# Patient Record
Sex: FEMALE
[De-identification: names, ages, dates, MRNs, and addresses within clinical notes are randomized per-mention and may not be internally consistent; named-entity substitution may affect disease eponyms.]

---

## 2020-02-25 PROBLEM — Z00.00 ENCOUNTER FOR PREVENTIVE HEALTH EXAMINATION: Status: ACTIVE | Noted: 2020-02-25

## 2020-02-26 ENCOUNTER — FORM ENCOUNTER (OUTPATIENT)
Age: 45
End: 2020-02-26

## 2020-02-27 ENCOUNTER — OUTPATIENT (OUTPATIENT)
Dept: OUTPATIENT SERVICES | Facility: HOSPITAL | Age: 45
LOS: 1 days | End: 2020-02-27
Payer: COMMERCIAL

## 2020-02-27 ENCOUNTER — APPOINTMENT (OUTPATIENT)
Dept: ORTHOPEDIC SURGERY | Facility: CLINIC | Age: 45
End: 2020-02-27
Payer: MEDICAID

## 2020-02-27 VITALS
SYSTOLIC BLOOD PRESSURE: 130 MMHG | HEIGHT: 66 IN | WEIGHT: 179 LBS | OXYGEN SATURATION: 98 % | HEART RATE: 100 BPM | DIASTOLIC BLOOD PRESSURE: 80 MMHG | BODY MASS INDEX: 28.77 KG/M2

## 2020-02-27 DIAGNOSIS — Z87.39 PERSONAL HISTORY OF OTHER DISEASES OF THE MUSCULOSKELETAL SYSTEM AND CONNECTIVE TISSUE: ICD-10-CM

## 2020-02-27 DIAGNOSIS — M47.812 SPONDYLOSIS W/OUT MYELOPATHY OR RADICULOPATHY, CERVICAL REGION: ICD-10-CM

## 2020-02-27 DIAGNOSIS — G89.29 CERVICALGIA: ICD-10-CM

## 2020-02-27 DIAGNOSIS — Z86.19 PERSONAL HISTORY OF OTHER INFECTIOUS AND PARASITIC DISEASES: ICD-10-CM

## 2020-02-27 DIAGNOSIS — Z87.891 PERSONAL HISTORY OF NICOTINE DEPENDENCE: ICD-10-CM

## 2020-02-27 DIAGNOSIS — M54.2 CERVICALGIA: ICD-10-CM

## 2020-02-27 DIAGNOSIS — M25.50 PAIN IN UNSPECIFIED JOINT: ICD-10-CM

## 2020-02-27 PROCEDURE — 72052 X-RAY EXAM NECK SPINE 6/>VWS: CPT | Mod: 26

## 2020-02-27 PROCEDURE — 99204 OFFICE O/P NEW MOD 45 MIN: CPT

## 2020-02-27 PROCEDURE — 72052 X-RAY EXAM NECK SPINE 6/>VWS: CPT

## 2020-02-27 NOTE — REVIEW OF SYSTEMS
[Negative] : Endocrine [Joint Pain] : joint pain [Joint Stiffness] : joint stiffness [Joint Swelling] : joint swelling [Feeling Tired] : fatigue [Urinary Frequency] : urinary frequency [Urinary Urgency] : urinary urgency [Headache] : headache [Anxiety] : anxiety [Depression] : depression [Sleep Disturbances] : ~T sleep disturbances

## 2020-02-27 NOTE — HISTORY OF PRESENT ILLNESS
[de-identified] : The patient is a 44 year old woman with history of Hepatitis B presenting with neck pain.\par \par The patient presents with a several year history of vague, bilateral neck stiffness and pain.  She recently transitioned care from a hospital system in the [place].  She has multiple musculoskeletal issues including lumbar degenerative disc disease/spondylosis.  Apparently, she was not amenable to epidural injections because of history of Hepatitis B.  In addition to this, she has not followed with her Hepatologist for several months.  She was suggested to see a pain management specialist, but has yet to do so.  She also has chronic left hip pain, and has antalgic gait, for which she uses a walking cane as an assist device.  She takes sole care for several children.  She has relief when standing.\par \par She now complains of chronic bilateral paracervical pain and occasional spasm.  The patient denies red flag symptoms including fever, chills, weight loss, night sweats, bowel/bladder dysfunction, saddle anesthesia.  She had had occasional, intermittent numbness in her hands in the past, but has not been prevalent as of late.  She was previously on baclofen for muscular spasms.  \par \par Pain is rated 8/10, described as achy/stiff, without alleviating factors, worse with walking/lying supinee [8] : a current pain level of 8/10

## 2020-02-27 NOTE — DISCUSSION/SUMMARY
[de-identified] : The patient is a 44 year old woman with history of Hepatitis, Lumbar Degenerative Disc Disease, Chronic Left Hip pain with use of walking cane as chronic assist device presenting with bilateral neck pain.  Her pain is likely secondary to myofascial pain syndrome.  She has no focal deficit todays.  \par \par In general, the patient has multiple polyarthralgias.  She would benefit with close follow-up with PMD, Hepatology.  I recommend a Rheumatology consultation in the context of polyarthralgias and Hepatitis B.  She was given referral to Dr. Rendon.\par \par Imaging was reviewed with the patient in detail.  All questions were answered appropriately.\par \par We will trial a course of supervised PT.  Patient was prescribed a course of physical therapy today.  The patient was also provided some general home exercises.  The patient was counseled on activity modification.\par \par Patient encouraged to maintain healthy lifestyle habits.  She may follow with me or other provider to discuss other MSK issues that may prohibit functional improvement.\par \par Follow-up in 4-6 weeks after PT.\par \par Patient appreciates and agrees with current plan.\par \par This note was generated using dragon medical dictation software.  A reasonable effort has been made for proofreading its contents, but typos may still remain.  If there are any questions or points of clarification needed please notify my office.\par \par

## 2020-02-27 NOTE — PHYSICAL EXAM
[de-identified] : General: Well-nourished, well-developed, alert, and in no acute distress.\par Head: Normocephalic.\par Eyes: Pupils equal round reactive to light and accommodation, extraocular muscles intact, normal sclera.\par Nose: No nasal discharge.\par Cardiac: Regular rate. Extremities are warm and well perfused. Distal pulses are symmetric bilaterally.\par Respiratory: No labored breathing.\par Extremities: Sensation is intact distally bilaterally.  Distal pulses are symmetric bilaterally\par Neurologic: No focal deficits\par Skin: Normal skin color, texture, and turgor\par Psychiatric: Normal affect\par \par Cervical Spine:\par \par Inspection:\par ·	Alignment/Posture: STRAIGHTNENING OF LORDOSIS\par ·	Spasm not noted. \par ·	No scars\par \par Palpation:   \par ·	Midline:  NO pain\par ·	Paracervical:    MILD BILATERAL pain\par ·	Trapezius:   MILD BILATERAL pain\par ·	Levator Scapulae:  NO pain\par ·	Rhomboids:  NO pain\par \par \par ROM: \par ·	 Flexion:  30 degrees, without pain.\par ·	Extension:  30 degrees without pain  \par ·	Side Bendin degrees without pain \par ·	Rotation:  50 degrees without pain \par \par \par Strength: \par ·	5/5 Flexion, Extension, Sidebending, Rotation\par \par NEURO  \par ·	Sensation:   Intact throughout the upper and lower extremity\par ·	DTR's:  Symmetric throughout the upper and lower extremity.  \par \par Other tests: \par ·	Spurling's Maneuver: NEGATIVE \par \par Vascular:  \par ·	No cyanosis, clubbing, edema.  \par ·	Intact pulses distally\par \par Low Back:\par \par Inspection:\par ·	Alignment: LEFT-SIDED LIST, RIGHT-SIDED HIP HITCH, FORWARD STOOPED POSTURE\par ·	Spasm not noted. \par ·	No scars\par \par Palpation:   \par ·	SI  Joints:  MILD BILATERAL pain\par ·	Iliolumbar ligaments    NO pain\par ·	Quadratus lumborum   NO pain\par ·	Interspinous ligament  NO pain\par ·	Greater trochanter  NO pain\par ·	Mid thoracic spine: NO  pain\par \par \par ROM: \par ·	 Flexion:  50 degrees, without pain.\par ·	Extension:  5-10 degrees without pain  \par ·	Side Bending:  Full, without pain \par PAIN WITH FACET LOADING\par \par \par Strength: \par ·	Core:  Trendelenburg: FAIR-GOOD\par ·	Hip / Leg Flexion, Extension  4+/5\par ·	Foot Eversion/ Inversion:   5/5\par ·	Calf:   5/5\par ·	EHL:  5/5\par \par NEURO  \par ·	Sensation:   Intact throughout the lower extremity\par ·	DTR's:  Symmetric throughout the lower extremity.  \par \par Other tests: \par ·	Slump test: NEGATIVE\par ·	Straight leg raise test:   NEGATIVE\par Vascular:  \par ·	No cyanosis, clubbing, edema.  \par ·	Palpable dorsalis pedis.\par \par \par  [de-identified] : Xray Cervical Spine - Multiple views were reviewed with the patient in detail.  There is normal curvature.  There is mild straightening of cervical lordosis.  There is no acute fracture.  There does not appear to be instability with flexion/extension.  There is mild spondylosis with anterior lipping at C5.  We will await formal radiology reading.\par \par